# Patient Record
Sex: MALE | Race: WHITE | NOT HISPANIC OR LATINO | Employment: STUDENT | URBAN - METROPOLITAN AREA
[De-identification: names, ages, dates, MRNs, and addresses within clinical notes are randomized per-mention and may not be internally consistent; named-entity substitution may affect disease eponyms.]

---
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
## 2025-01-27 ENCOUNTER — OFFICE VISIT (OUTPATIENT)
Dept: FAMILY MEDICINE | Facility: CLINIC | Age: 20
End: 2025-01-27
Payer: COMMERCIAL

## 2025-01-27 VITALS
OXYGEN SATURATION: 97 % | DIASTOLIC BLOOD PRESSURE: 58 MMHG | WEIGHT: 127 LBS | BODY MASS INDEX: 18.81 KG/M2 | HEIGHT: 69 IN | HEART RATE: 67 BPM | SYSTOLIC BLOOD PRESSURE: 125 MMHG

## 2025-01-27 DIAGNOSIS — F41.9 ANXIETY AND DEPRESSION: ICD-10-CM

## 2025-01-27 DIAGNOSIS — F32.A ANXIETY AND DEPRESSION: ICD-10-CM

## 2025-01-27 PROCEDURE — 99213 OFFICE O/P EST LOW 20 MIN: CPT | Mod: S$GLB,,, | Performed by: FAMILY MEDICINE

## 2025-01-27 PROCEDURE — 3008F BODY MASS INDEX DOCD: CPT | Mod: CPTII,S$GLB,, | Performed by: FAMILY MEDICINE

## 2025-01-27 PROCEDURE — 3078F DIAST BP <80 MM HG: CPT | Mod: CPTII,S$GLB,, | Performed by: FAMILY MEDICINE

## 2025-01-27 PROCEDURE — 99999 PR PBB SHADOW E&M-EST. PATIENT-LVL III: CPT | Mod: PBBFAC,,, | Performed by: FAMILY MEDICINE

## 2025-01-27 PROCEDURE — 3074F SYST BP LT 130 MM HG: CPT | Mod: CPTII,S$GLB,, | Performed by: FAMILY MEDICINE

## 2025-01-27 RX ORDER — BUSPIRONE HYDROCHLORIDE 10 MG/1
10 TABLET ORAL 3 TIMES DAILY
Qty: 270 TABLET | Refills: 2 | Status: SHIPPED | OUTPATIENT
Start: 2025-01-27 | End: 2025-10-24

## 2025-01-27 NOTE — PROGRESS NOTES
SUBJECTIVE:    Patient ID: Monster Delgado is a 19 y.o. male.    Chief Complaint: Anxiety  20 yo male n here to follow-up on anxiety, patient states his anxiety has been well controlled currently on buspirone he would like to stop the Vyvanse he thinks that was making his anxiety worse, he has not had any more panic attacks since stopping the Vyvanse.     Significant Past Medical History  Anxiety: Busparone 10mg  ADHD: Vyvanse 30mg         Specialists  None     Smoke: None  ETOH: 2-3  Exercise: None    Anxiety  Presents for follow-up visit. Patient reports no chest pain, compulsions, confusion, decreased concentration, depressed mood, dizziness, dry mouth, dysphagia, dyspnea, excessive worry, feeling of choking, hyperventilation, insomnia, irritability, malaise, muscle tension, nausea, nervous/anxious behavior, obsessions, palpitations, panic, restlessness, shortness of breath or suicidal ideas. The severity of symptoms is mild. The quality of sleep is fair. Nighttime awakenings: none.     Compliance with medications is %.           Past Medical History:   Diagnosis Date    ADHD (attention deficit hyperactivity disorder)     Anxiety     Asthma     Depression      Social History     Socioeconomic History    Marital status: Single   Tobacco Use    Smoking status: Every Day     Types: Vaping with nicotine     Passive exposure: Never    Smokeless tobacco: Never   Substance and Sexual Activity    Alcohol use: Yes     Alcohol/week: 3.0 standard drinks of alcohol     Types: 3 Shots of liquor per week    Drug use: Not Currently     Types: Marijuana    Sexual activity: Yes     Partners: Female   Social History Narrative     He lives with his mother and 1 sister. 1 dog, no smokers. [employer].      Social Drivers of Health     Financial Resource Strain: High Risk (7/15/2024)    Overall Financial Resource Strain (CARDIA)     Difficulty of Paying Living Expenses: Hard   Food Insecurity: No Food Insecurity (7/15/2024)     Hunger Vital Sign     Worried About Running Out of Food in the Last Year: Never true     Ran Out of Food in the Last Year: Never true   Physical Activity: Inactive (7/15/2024)    Exercise Vital Sign     Days of Exercise per Week: 0 days     Minutes of Exercise per Session: 0 min   Stress: Stress Concern Present (7/15/2024)    [employer] [place] [employer] - Occupational Stress Questionnaire     Feeling of Stress : To some extent   Housing Stability: Unknown (7/15/2024)    Housing Stability Vital Sign     Unable to Pay for Housing in the Last Year: No     History reviewed. No pertinent surgical history.  Family History   Problem Relation Name Age of Onset    Diabetes Mother Naima     Hypertension Mother Naima     Hyperlipidemia Mother Naima     Depression Mother Naima     Asthma Mother Naima     Hypertension Father Alvaro     Anxiety disorder Father Alvaro     Anxiety disorder Sister Nela     Hypertension Maternal Grandmother      Hyperlipidemia Maternal Grandmother      Hypertension Maternal Grandfather      Hyperlipidemia Maternal Grandfather      Hypertension Paternal Grandmother      Heart disease Paternal Grandfather       Current Outpatient Medications   Medication Sig Dispense Refill    busPIRone (BUSPAR) 10 MG tablet Take 1 tablet (10 mg total) by mouth 3 (three) times daily. 270 tablet 2     No current facility-administered medications for this visit.     Review of patient's allergies indicates:  No Known Allergies    Review of Systems   Constitutional:  Negative for activity change, diaphoresis, fatigue, irritability and unexpected weight change.   HENT:  Negative for congestion, hearing loss, postnasal drip, rhinorrhea, sinus pressure, sinus pain and trouble swallowing.    Eyes:  Negative for discharge and visual disturbance.   Respiratory:  Negative for cough, chest tightness, shortness of breath and wheezing.    Cardiovascular:  Negative for chest pain and palpitations.   Gastrointestinal:   "Negative for abdominal distention, abdominal pain, anal bleeding, blood in stool, constipation, diarrhea, nausea and vomiting.   Endocrine: Negative for polydipsia and polyuria.   Genitourinary:  Negative for difficulty urinating, hematuria and urgency.   Musculoskeletal:  Negative for arthralgias, joint swelling and neck pain.   Neurological:  Negative for dizziness, weakness and headaches.   Psychiatric/Behavioral:  Negative for confusion, decreased concentration, dysphoric mood and suicidal ideas. The patient is not nervous/anxious and does not have insomnia.           Blood pressure (!) 125/58, pulse 67, height 5' 9.25" (1.759 m), weight 57.6 kg (127 lb), SpO2 97%. Body mass index is 18.62 kg/m².   Objective:      Physical Exam  Vitals reviewed.   Constitutional:       General: He is not in acute distress.     Appearance: Normal appearance. He is not ill-appearing or toxic-appearing.   HENT:      Head: Normocephalic and atraumatic.      Nose: Nose normal. No congestion or rhinorrhea.      Mouth/Throat:      Mouth: Mucous membranes are moist.      Pharynx: No oropharyngeal exudate or posterior oropharyngeal erythema.   Eyes:      General: No scleral icterus.        Right eye: No discharge.         Left eye: No discharge.      Pupils: Pupils are equal, round, and reactive to light.   Cardiovascular:      Rate and Rhythm: Normal rate and regular rhythm.      Heart sounds: Normal heart sounds. No murmur heard.  Pulmonary:      Effort: Pulmonary effort is normal. No respiratory distress.      Breath sounds: Normal breath sounds. No wheezing or rhonchi.   Skin:     General: Skin is warm.      Capillary Refill: Capillary refill takes less than 2 seconds.   Neurological:      Mental Status: He is alert and oriented to person, place, and time.       Assessment:       1. Anxiety and depression         Plan:           Anxiety and depression  -     busPIRone (BUSPAR) 10 MG tablet; Take 1 tablet (10 mg total) by mouth 3 " (three) times daily.  Dispense: 270 tablet; Refill: 2    Advised the patient on home management strategies for anxiety, emphasizing the importance of establishing a regular routine to provide structure and stability. Recommended practicing relaxation techniques such as deep breathing exercises, meditation, or progressive muscle relaxation to reduce stress. Encouraged regular physical activity, like walking or yoga, to help alleviate symptoms. Suggested limiting caffeine and alcohol intake, as these can exacerbate anxiety symptoms.

## 2025-05-26 ENCOUNTER — TELEPHONE (OUTPATIENT)
Dept: FAMILY MEDICINE | Facility: CLINIC | Age: 20
End: 2025-05-26
Payer: COMMERCIAL

## 2025-07-15 ENCOUNTER — PATIENT MESSAGE (OUTPATIENT)
Dept: ADMINISTRATIVE | Facility: HOSPITAL | Age: 20
End: 2025-07-15
Payer: COMMERCIAL